# Patient Record
Sex: FEMALE | Race: WHITE
[De-identification: names, ages, dates, MRNs, and addresses within clinical notes are randomized per-mention and may not be internally consistent; named-entity substitution may affect disease eponyms.]

---

## 2017-11-22 ENCOUNTER — HOSPITAL ENCOUNTER (OUTPATIENT)
Dept: HOSPITAL 47 - RADFLWHC | Age: 42
Discharge: HOME | End: 2017-11-22
Payer: COMMERCIAL

## 2017-11-22 DIAGNOSIS — K21.9: Primary | ICD-10-CM

## 2017-11-22 DIAGNOSIS — K44.9: ICD-10-CM

## 2017-11-22 PROCEDURE — 74220 X-RAY XM ESOPHAGUS 1CNTRST: CPT

## 2017-11-22 NOTE — FL
EXAMINATION TYPE: FL barium swallow

 

DATE OF EXAM: 11/22/2017

 

CLINICAL HISTORY: Dysphagia, dysphonia, and palpitations.

 

TECHNIQUE:  A double contrast esophagram is performed utilizing air and barium.  A total of 1.7 minut
es of fluoroscopic time was utilized during procedure with 120 images saved.

 

COMPARISON: None

 

FINDINGS: The esophagus shows normal motility and emptying into the stomach.  No evidence of strictur
e is noted. Minimal gastroesophageal reflux was seen during real time performance of this study to th
e level of the distal third of the esophagus.. No Zenker's or Ozzie Bunny diverticula are seen to a
ccount for the patient's symptoms. A small hiatal hernia is noted. Numerous intraluminal gastric fill
ing defects do not persist on delayed images and relate to the effervescent crystals.

 

There is extrinsic compression upon the gastric body along the greater curvature creating an obtuse a
ngle best seen on images 12 through 15 but persistent throughout the examination.

 

IMPRESSION:

1. Extrinsic compression upon the gastric body creating an obtuse angle. Further evaluation with enha
nced CT abdomen is recommended

2. Small hiatal hernia.

3. No upper esophageal abnormality, Ozzie Bunny diverticulum, or Zenker's diverticulum to correspon
d to the patient's clinical symptoms.

4. Minimal gastroesophageal reflux to the level of the lower thoracic esophagus (distal third).

## 2017-12-28 ENCOUNTER — HOSPITAL ENCOUNTER (EMERGENCY)
Dept: HOSPITAL 47 - EC | Age: 42
Discharge: HOME | End: 2017-12-28
Payer: COMMERCIAL

## 2017-12-28 VITALS — DIASTOLIC BLOOD PRESSURE: 66 MMHG | SYSTOLIC BLOOD PRESSURE: 111 MMHG | HEART RATE: 63 BPM

## 2017-12-28 VITALS — RESPIRATION RATE: 16 BRPM

## 2017-12-28 VITALS — TEMPERATURE: 98.2 F

## 2017-12-28 DIAGNOSIS — Z88.8: ICD-10-CM

## 2017-12-28 DIAGNOSIS — Z53.20: ICD-10-CM

## 2017-12-28 DIAGNOSIS — J06.9: ICD-10-CM

## 2017-12-28 DIAGNOSIS — F17.200: ICD-10-CM

## 2017-12-28 DIAGNOSIS — Z79.899: ICD-10-CM

## 2017-12-28 DIAGNOSIS — J32.9: Primary | ICD-10-CM

## 2017-12-28 LAB
PH UR: 6.5 [PH] (ref 5–8)
SP GR UR: 1 (ref 1–1.03)
UA BILLING (MACRO VS. MICRO): (no result)
UROBILINOGEN UR QL STRIP: <2 MG/DL (ref ?–2)

## 2017-12-28 PROCEDURE — 71020: CPT

## 2017-12-28 PROCEDURE — 81003 URINALYSIS AUTO W/O SCOPE: CPT

## 2017-12-28 PROCEDURE — 87502 INFLUENZA DNA AMP PROBE: CPT

## 2017-12-28 PROCEDURE — 99284 EMERGENCY DEPT VISIT MOD MDM: CPT

## 2017-12-28 PROCEDURE — 87086 URINE CULTURE/COLONY COUNT: CPT

## 2017-12-28 NOTE — ED
General Adult HPI





- General


Chief complaint: Headache


Stated complaint: back & chest pain


Time Seen by Provider: 12/28/17 15:33


Source: patient, RN notes reviewed, old records reviewed


Mode of arrival: ambulatory


Limitations: no limitations





- History of Present Illness


Initial comments: 





This is a 42-year-old female to the ER for evaluation.  She presents here for 

evaluation of cough congestion and sinusitis chest pain not feeling well.  

Patient denies fevers occasional chills.  No significant medical history takes 

no medications.  Patient saw her doctor for congestion sinus infection one week 

ago, no antibiotics prescribed.  A CT of her chest showed likely pneumonia.  

Patient states her symptoms have progressed since then is feeling mildly worse.

  Mild nausea no vomiting.  She denies any fevers again no no known sick 

contacts.  Patient denies any shortness of breath or chest pain





- Related Data


 Home Medications











 Medication  Instructions  Recorded  Confirmed


 


Multivitamins, Thera [Multivitamin 1 tab PO DAILY 12/28/17 12/28/17





(formulary)]   








 Previous Rx's











 Medication  Instructions  Recorded


 


Amoxicillin/Potassium Clav 1 tab PO Q12HR #20 tab 12/28/17





[Augmentin 875-125 Tablet]  











 Allergies











Allergy/AdvReac Type Severity Reaction Status Date / Time


 


bupropion [From Wellbutrin] Allergy  Unknown Verified 12/28/17 15:43














Review of Systems


ROS Statement: 


Those systems with pertinent positive or pertinent negative responses have been 

documented in the HPI.





ROS Other: All systems not noted in ROS Statement are negative.





Past Medical History


Past Medical History: Pneumonia


Additional Past Medical History / Comment(s): tachycardia


History of Any Multi-Drug Resistant Organisms: None Reported


Past Surgical History: No Surgical Hx Reported


Past Psychological History: No Psychological Hx Reported


Smoking Status: Current every day smoker


Past Alcohol Use History: None Reported


Past Drug Use History: None Reported





General Exam


Limitations: no limitations


General appearance: alert, in no apparent distress


Head exam: Present: atraumatic, normocephalic, normal inspection


Eye exam: Present: normal appearance, PERRL, EOMI.  Absent: scleral icterus, 

conjunctival injection, periorbital swelling


ENT exam: Present: normal exam, mucous membranes moist


Neck exam: Present: normal inspection.  Absent: tenderness, meningismus, 

lymphadenopathy


Respiratory exam: Present: normal lung sounds bilaterally.  Absent: respiratory 

distress, wheezes, rales, rhonchi, stridor


Cardiovascular Exam: Present: regular rate, normal rhythm, normal heart sounds.

  Absent: systolic murmur, diastolic murmur, rubs, gallop, clicks


GI/Abdominal exam: Present: soft, normal bowel sounds.  Absent: distended, 

tenderness, guarding, rebound, rigid


Extremities exam: Present: normal inspection, full ROM, normal capillary 

refill.  Absent: tenderness, pedal edema, joint swelling, calf tenderness


Back exam: Present: normal inspection


Neurological exam: Present: alert, oriented X3, CN II-XII intact


Psychiatric exam: Present: normal affect, normal mood


Skin exam: Present: warm, dry, intact, normal color.  Absent: rash





Course


 Vital Signs











  12/28/17 12/28/17





  14:39 16:53


 


Temperature 98.7 F 98.2 F


 


Pulse Rate 78 69


 


Respiratory 16 16





Rate  


 


Blood Pressure 128/74 115/56


 


O2 Sat by Pulse 100 100





Oximetry  














- Reevaluation(s)


Reevaluation #1: 





12/28/17 17:03


Patient's in no acute distress feeling better with simply treatment here in the 

ER





Medical Decision Making





- Medical Decision Making





Provisional ER for evaluation of not feeling well, he should has sinusitis type 

infection, runny nose cough congestion.  Upper respiratory infection, chest x-

rays negative, patient be treated appropriately, synthetic therapy discharged 

home





- Lab Data


 Lab Results











  12/28/17 Range/Units





  16:28 


 


Urine Color  Light Yellow  


 


Urine Appearance  Clear  (Clear)  


 


Urine pH  6.5  (5.0-8.0)  


 


Ur Specific Gravity  1.004  (1.001-1.035)  


 


Urine Protein  Negative  (Negative)  


 


Urine Glucose (UA)  Negative  (Negative)  


 


Urine Ketones  Negative  (Negative)  


 


Urine Blood  Negative  (Negative)  


 


Urine Nitrite  Negative  (Negative)  


 


Urine Bilirubin  Negative  (Negative)  


 


Urine Urobilinogen  <2.0  (<2.0)  mg/dL


 


Ur Leukocyte Esterase  Negative  (Negative)  














Disposition


Clinical Impression: 


 Sinusitis, Upper respiratory infection





Disposition: HOME SELF-CARE


Condition: Good


Instructions:  Upper Respiratory Infection (ED), Sinusitis (ED)


Prescriptions: 


Amoxicillin/Potassium Clav [Augmentin 875-125 Tablet] 1 tab PO Q12HR #20 tab


Referrals: 


Blanca Hanks MD [Primary Care Provider] - 1-2 days

## 2017-12-28 NOTE — XR
EXAMINATION TYPE: XR chest 2V

 

DATE OF EXAM: 12/28/2017

 

COMPARISON: NONE

 

HISTORY: Chest pain

 

TECHNIQUE:  Frontal and lateral views of the chest are obtained.

 

FINDINGS:  There is no focal air space opacity, pleural effusion, or pneumothorax seen.  The cardiac 
silhouette size is small.  Prominent lung volume could be indicative of COPD. The osseous structures 
are intact.

 

IMPRESSION:  No acute cardiopulmonary process.

## 2018-02-28 ENCOUNTER — HOSPITAL ENCOUNTER (OUTPATIENT)
Dept: HOSPITAL 47 - RADMAMWWP | Age: 43
Discharge: HOME | End: 2018-02-28
Attending: OBSTETRICS & GYNECOLOGY
Payer: COMMERCIAL

## 2018-02-28 DIAGNOSIS — Z12.31: Primary | ICD-10-CM

## 2018-02-28 PROCEDURE — 77067 SCR MAMMO BI INCL CAD: CPT

## 2018-03-01 NOTE — MM
Reason for exam: screening  (asymptomatic).



History:

Excisional biopsy of the left breast, 1983.



Physical Findings:

A clinical breast exam by your physician is recommended on an annual basis and 

results should be correlated with mammographic findings.



MG Screening Mammo w CAD

Bilateral CC and MLO view(s) were taken.

No prior studies available for comparison.

The breast tissue is heterogeneously dense. This may lower the sensitivity of 

mammography.  There is no discrete abnormality.  No significant changes when 

compared with prior studies.





ASSESSMENT: Negative, BI-RAD 1



RECOMMENDATION:

Routine screening mammogram of both breasts in 1 year.